# Patient Record
Sex: FEMALE | URBAN - METROPOLITAN AREA
[De-identification: names, ages, dates, MRNs, and addresses within clinical notes are randomized per-mention and may not be internally consistent; named-entity substitution may affect disease eponyms.]

---

## 2023-04-26 ENCOUNTER — OFFICE VISIT (OUTPATIENT)
Facility: LOCATION | Age: 48
End: 2023-04-26
Payer: COMMERCIAL

## 2023-04-26 DIAGNOSIS — H16.223 KERATOCONJUNCT SICCA, NOT SPECIFIED AS SJOGREN'S, BILATERAL: ICD-10-CM

## 2023-04-26 DIAGNOSIS — H25.812 COMBINED FORMS OF AGE-RELATED CATARACT, LEFT EYE: ICD-10-CM

## 2023-04-26 DIAGNOSIS — H04.123 DRY EYE SYNDROME OF BILATERAL LACRIMAL GLANDS: ICD-10-CM

## 2023-04-26 DIAGNOSIS — H26.131 TOTAL TRAUMATIC CATARACT, RIGHT EYE: Primary | ICD-10-CM

## 2023-04-26 PROCEDURE — 92134 CPTRZ OPH DX IMG PST SGM RTA: CPT | Performed by: OPHTHALMOLOGY

## 2023-04-26 PROCEDURE — 92136 OPHTHALMIC BIOMETRY: CPT | Performed by: OPHTHALMOLOGY

## 2023-04-26 PROCEDURE — 92025 CPTRIZED CORNEAL TOPOGRAPHY: CPT | Performed by: OPHTHALMOLOGY

## 2023-04-26 PROCEDURE — 99204 OFFICE O/P NEW MOD 45 MIN: CPT | Performed by: OPHTHALMOLOGY

## 2023-04-26 RX ORDER — ERYTHROMYCIN 5 MG/G
OINTMENT OPHTHALMIC
Qty: 3.5 | Refills: 11 | Status: ACTIVE
Start: 2023-04-26

## 2023-04-26 ASSESSMENT — VISUAL ACUITY
OS: 20/20
OD: 20/50

## 2023-04-26 ASSESSMENT — INTRAOCULAR PRESSURE
OD: 14
OS: 14

## 2023-04-26 NOTE — IMPRESSION/PLAN
Impression: Keratoconjunct sicca, not specified as Sjogren's, bilateral: U73.735. Plan: RTC in 1-2 weeks for ext x4 and Lipiview with Dr. Zac Nice.

## 2023-04-26 NOTE — IMPRESSION/PLAN
Impression: Dry eye syndrome of bilateral lacrimal glands: H04.123. Plan: Start Oasis Tears Plus q2h WA OU, erythromycin chriss qhs OU, hot compresses BID OU, and omega 3s. DEWS II handout reviewed and given to patient. Dry Eye Passport reviewed and given to patient. Lacrimal plug brochure reviewed and given to patient. Cataract + JARED information packet reviewed and given to patient.

## 2023-04-26 NOTE — IMPRESSION/PLAN
Impression: Combined forms of age-related cataract, left eye: H25.812. Plan: Hold off on cataract surgery OS.

## 2023-04-26 NOTE — IMPRESSION/PLAN
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 Plan: MAC OCT, Lenstar, TMS, and iTrace ordered today. Explained to pt that there is no way to prove that the cataracts were caused by the fall, but as she notes that her vision got worse after her fall, there is a chance they are related. Patient expresses understanding. No surgery at present until cleared by retina specialist and JARED is adequately treated. Emphasized importance of starting and maintaining dry eye treatment prior to cataract surgery. Referred to retina specialist for cataract surgery clearance. Patient leaning towards cataract surgery + FEMTO LRI OU. RTC in 3-4 weeks for re-evaluation with Dr. Vic Roach. No repeat testing at that visit. Dr. Vic Roach to check pupils at that visit. Dr. Vic Roach to perform goniotomy to rule out angle recession.

## 2023-05-02 ENCOUNTER — PROCEDURE (OUTPATIENT)
Facility: LOCATION | Age: 48
End: 2023-05-02
Payer: COMMERCIAL

## 2023-05-02 DIAGNOSIS — H57.09 OTHER ANOMALIES OF PUPILLARY FUNCTION: ICD-10-CM

## 2023-05-02 DIAGNOSIS — H26.131 TOTAL TRAUMATIC CATARACT, RIGHT EYE: ICD-10-CM

## 2023-05-02 DIAGNOSIS — H25.812 COMBINED FORMS OF AGE-RELATED CATARACT, LEFT EYE: ICD-10-CM

## 2023-05-02 DIAGNOSIS — H16.223 KERATOCONJUNCT SICCA, NOT SPECIFIED AS SJOGREN'S, BILATERAL: ICD-10-CM

## 2023-05-02 DIAGNOSIS — H04.123 DRY EYE SYNDROME OF BILATERAL LACRIMAL GLANDS: Primary | ICD-10-CM

## 2023-05-02 ASSESSMENT — INTRAOCULAR PRESSURE
OD: 13
OS: 14

## 2023-05-02 NOTE — IMPRESSION/PLAN
Impression: Dry eye syndrome of bilateral lacrimal glands: H04.123. Mary not performed this visit. Plan: Patient to continue the use of Oasis Tears Plus q2h WA OU, erythromycin chriss qhs OU, hot compresses BID OU, and omega 3s. Extended duration x4 placed today w/o complications. Patient tolerated and was consented. Patient reminded to not rub inner corners of eyes to avoid agitating plugs. Explained to patient that inner corners may feel sore after procedure and should feel better as area heals in a few days. Recommend cold compress for itching. RTC if any pain occurs. Patient expressed understanding. Emphasized importance of starting and maintaining dry eye treatment.

## 2023-05-02 NOTE — IMPRESSION/PLAN
Impression: Keratoconjunct sicca, not specified as Sjogren's, bilateral: Q59.605. Plan: Refer to plan 1.

## 2023-05-02 NOTE — IMPRESSION/PLAN
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
(-) APD. Fixed pupil OS Discussed with patient about irregular pupil function OS. Explained to patient she may need to follow up with a neurologist for further treatment. Will revisit when cataract surgery is done. Plan: Consider f/u w/ Dr. Jailyn Conway M.D. for consultation after [address]. Pt to bring testing and charts from previous PCP and hospital visits.

## 2023-05-02 NOTE — IMPRESSION/PLAN
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 Plan: Explained to pt that there is no way to prove that the cataracts were caused by the fall, but as she notes that her vision got worse after her fall, there is a chance they are related. Patient expresses understanding. No surgery at present until cleared by retina specialist and JARED is adequately treated. Referred to retina specialist for cataract surgery clearance. Patient leaning towards cataract surgery + FEMTO LRI OU. RTC in 3-4 weeks for re-evaluation with Dr. Brandi Yusuf. Lipiview this visit, no other repeat testing. Dr. Brandi Yusuf to check pupils at that visit. Dr. Brandi Yusuf to perform goniotomy to rule out angle recession.

## 2023-05-02 NOTE — IMPRESSION/PLAN
Impression: Combined forms of age-related cataract, left eye: H25.812.
1+ NS, 1+ CC OS. Hold off on surgery at this time. Plan: No cataract surgery indicated at this time, will revisit when patient is bothered.

## 2023-06-19 ENCOUNTER — OFFICE VISIT (OUTPATIENT)
Facility: LOCATION | Age: 48
End: 2023-06-19
Payer: COMMERCIAL

## 2023-06-19 DIAGNOSIS — H57.09 OTHER ANOMALIES OF PUPILLARY FUNCTION: ICD-10-CM

## 2023-06-19 DIAGNOSIS — H26.131 TOTAL TRAUMATIC CATARACT, RIGHT EYE: ICD-10-CM

## 2023-06-19 DIAGNOSIS — H25.812 COMBINED FORMS OF AGE-RELATED CATARACT, LEFT EYE: ICD-10-CM

## 2023-06-19 DIAGNOSIS — H04.123 DRY EYE SYNDROME OF BILATERAL LACRIMAL GLANDS: Primary | ICD-10-CM

## 2023-06-19 DIAGNOSIS — H16.223 KERATOCONJUNCT SICCA, NOT SPECIFIED AS SJOGREN'S, BILATERAL: ICD-10-CM

## 2023-06-19 PROCEDURE — 0330T TEAR FILM IMG UNI/BI W/I&R: CPT | Performed by: OPHTHALMOLOGY

## 2023-06-19 PROCEDURE — 99214 OFFICE O/P EST MOD 30 MIN: CPT | Performed by: OPHTHALMOLOGY

## 2023-06-19 RX ORDER — CYCLOSPORINE 0 G/ML
0.09 % SOLUTION/ DROPS OPHTHALMIC; TOPICAL
Qty: 60 | Refills: 5 | Status: ACTIVE
Start: 2023-06-19

## 2023-06-19 RX ORDER — MOXIFLOXACIN 5 MG/ML
0.5 % SOLUTION/ DROPS OPHTHALMIC
Qty: 3 | Refills: 3 | Status: ACTIVE
Start: 2023-06-19

## 2023-06-19 RX ORDER — PREDNISOLONE ACETATE 10 MG/ML
1 % SUSPENSION/ DROPS OPHTHALMIC
Qty: 15 | Refills: 3 | Status: ACTIVE
Start: 2023-06-19

## 2023-06-19 RX ORDER — BROMFENAC SODIUM 0.7 MG/ML
0.07 % SOLUTION/ DROPS OPHTHALMIC
Qty: 3 | Refills: 3 | Status: ACTIVE
Start: 2023-06-19

## 2023-06-19 ASSESSMENT — VISUAL ACUITY
OD: 20/50
OS: 20/20

## 2023-06-19 ASSESSMENT — INTRAOCULAR PRESSURE
OS: 12
OD: 12

## 2023-06-19 NOTE — IMPRESSION/PLAN
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
(-) APD. Fixed pupil OS Discussed with patient about irregular pupil function OS. Explained to patient she may need to follow up with a neurologist for further treatment. Will revisit when cataract surgery is done. Plan: Consider f/u w/ Dr. Suzanne Cunningham M.D. for consultation after [address]. Pt to bring testing and charts from previous PCP and hospital visits.

## 2023-06-19 NOTE — IMPRESSION/PLAN
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 Patient states the onset of her cataract was after her fall in September while at work. Plan:  Discussed R/B/A of cataract surgery including risk of bleeding, infection, decreased BCVA, loss of eye. No guarantees, possible need  for further surgery. Patient expressed good understanding and wishes to proceed with CE/IOL OD. Explained to pt that there is no way to prove that the cataracts were caused by the fall, but as she notes that her vision got worse after her fall, there is a chance they are related. Patient expresses understanding. Patient saw [facility] we will need to scan retina notes in EHR. Cardiac Clearance will be needed prior to cataract surgery. Emphasized importance of starting and maintaining dry eye treatment prior to cataract surgery. Patient Elects to have FEMTO LRI OD. 1 week Post op with Dr. Yayo Roman. Plan cataract surgery OD 07/25/2023. Hold off OS Cataract surgery packet given to patient. Patient advised to bring their surgery drops to all their upcoming appointments and that their surgery could be canceled if they do not bring their drops to their surgery.

## 2023-06-19 NOTE — IMPRESSION/PLAN
Impression: Dry eye syndrome of bilateral lacrimal glands: H04.123. Lipiview performed today. 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 Patient had an opportunity to ask questions and had them answered to their satisfaction. Patient to continue the use of Artificial Tears Q1H WA OU and Erythromycin chriss. Discussed with patient to start the use of Hot moist compresses BID OU as well to help with JARED. Patient to start mindful blinking Exercises every 3-4 seconds to help with JARED. Patient expresses understanding.

## 2023-06-19 NOTE — IMPRESSION/PLAN
Impression: Keratoconjunct sicca, not specified as Sjogren's, bilateral: W70.505. Plan: See Plan above.

## 2023-08-09 ENCOUNTER — POST-OPERATIVE VISIT (OUTPATIENT)
Facility: LOCATION | Age: 48
End: 2023-08-09
Payer: COMMERCIAL

## 2023-08-09 DIAGNOSIS — Z48.810 ENCOUNTER FOR SURGICAL AFTERCARE FOLLOWING SURGERY ON A SENSE ORGAN: Primary | ICD-10-CM

## 2023-08-09 PROCEDURE — 99024 POSTOP FOLLOW-UP VISIT: CPT | Performed by: OPTOMETRIST

## 2023-08-09 ASSESSMENT — INTRAOCULAR PRESSURE
OD: 16
OS: 13

## 2023-08-16 ENCOUNTER — POST-OPERATIVE VISIT (OUTPATIENT)
Facility: LOCATION | Age: 48
End: 2023-08-16
Payer: COMMERCIAL

## 2023-08-16 DIAGNOSIS — Z48.810 ENCOUNTER FOR SURGICAL AFTERCARE FOLLOWING SURGERY ON A SENSE ORGAN: Primary | ICD-10-CM

## 2023-08-16 PROCEDURE — 99024 POSTOP FOLLOW-UP VISIT: CPT | Performed by: OPTOMETRIST

## 2023-08-16 ASSESSMENT — INTRAOCULAR PRESSURE
OS: 13
OD: 12

## 2023-08-16 ASSESSMENT — VISUAL ACUITY: OD: 20/20
